# Patient Record
Sex: FEMALE | Race: BLACK OR AFRICAN AMERICAN
[De-identification: names, ages, dates, MRNs, and addresses within clinical notes are randomized per-mention and may not be internally consistent; named-entity substitution may affect disease eponyms.]

---

## 2021-06-03 ENCOUNTER — HOSPITAL ENCOUNTER (EMERGENCY)
Dept: HOSPITAL 63 - ER | Age: 41
Discharge: HOME | End: 2021-06-03
Payer: OTHER GOVERNMENT

## 2021-06-03 VITALS — BODY MASS INDEX: 32.08 KG/M2 | WEIGHT: 204.37 LBS | HEIGHT: 67 IN

## 2021-06-03 VITALS — DIASTOLIC BLOOD PRESSURE: 72 MMHG | SYSTOLIC BLOOD PRESSURE: 155 MMHG

## 2021-06-03 DIAGNOSIS — N92.0: ICD-10-CM

## 2021-06-03 DIAGNOSIS — Z91.040: ICD-10-CM

## 2021-06-03 DIAGNOSIS — F41.9: ICD-10-CM

## 2021-06-03 DIAGNOSIS — N93.8: Primary | ICD-10-CM

## 2021-06-03 LAB
APTT PPP: YELLOW S
BACTERIA #/AREA URNS HPF: 0 /HPF
BASOPHILS # BLD AUTO: 0.1 X10^3/UL (ref 0–0.2)
BASOPHILS NFR BLD: 1 % (ref 0–3)
BILIRUB UR QL STRIP: (no result)
EOSINOPHIL NFR BLD: 0.2 X10^3/UL (ref 0–0.7)
EOSINOPHIL NFR BLD: 2 % (ref 0–3)
ERYTHROCYTE [DISTWIDTH] IN BLOOD BY AUTOMATED COUNT: 13.4 % (ref 11.5–14.5)
FIBRINOGEN PPP-MCNC: CLEAR MG/DL
GLUCOSE UR STRIP-MCNC: (no result) MG/DL
HCT VFR BLD CALC: 36.5 % (ref 36–47)
HGB BLD-MCNC: 12.6 G/DL (ref 12–15.5)
LYMPHOCYTES # BLD: 1.9 X10^3/UL (ref 1–4.8)
LYMPHOCYTES NFR BLD AUTO: 23 % (ref 24–48)
MCH RBC QN AUTO: 31 PG (ref 25–35)
MCHC RBC AUTO-ENTMCNC: 35 G/DL (ref 31–37)
MCV RBC AUTO: 91 FL (ref 79–100)
MONO #: 0.6 X10^3/UL (ref 0–1.1)
MONOCYTES NFR BLD: 7 % (ref 0–9)
NEUT #: 5.8 X10^3UL (ref 1.8–7.7)
NEUTROPHILS NFR BLD AUTO: 67 % (ref 31–73)
NITRITE UR QL STRIP: (no result)
PLATELET # BLD AUTO: 364 X10^3/UL (ref 140–400)
RBC # BLD AUTO: 4.01 X10^6/UL (ref 3.5–5.4)
RBC #/AREA URNS HPF: (no result) /HPF (ref 0–2)
SP GR UR STRIP: 1.02
SQUAMOUS #/AREA URNS LPF: (no result) /LPF
UROBILINOGEN UR-MCNC: 0.2 MG/DL
WBC # BLD AUTO: 8.6 X10^3/UL (ref 4–11)
WBC #/AREA URNS HPF: (no result) /HPF (ref 0–4)

## 2021-06-03 PROCEDURE — 36415 COLL VENOUS BLD VENIPUNCTURE: CPT

## 2021-06-03 PROCEDURE — 81001 URINALYSIS AUTO W/SCOPE: CPT

## 2021-06-03 PROCEDURE — 85025 COMPLETE CBC W/AUTO DIFF WBC: CPT

## 2021-06-03 NOTE — PHYS DOC
Past History


Past Medical History:  Anxiety


Alcohol Use:  None





General Adult


EDM:


Chief Complaint:  VAGINAL BLEEDING





HPI:


HPI:





Patient is a 41-year-old female presents with increase in menstrual bleeding and

clotting.  Patient states that she was changing her tampon every 2 hours due to 

bleeding.  Patient states that bleeding has decreased today and is changing her 

tampon every 3 hours.  Patient states that she spoke with her PCP this morning 

who referred her to the emergency room.  Patient did report some dizziness and 

fatigue but denies symptoms today.  Patient denies shortness of breath, 

abdominal pain, chest pain.  Denies dysuria or abnormal discharge.  Patient has 

a history of migraines, anxiety.





Review of Systems:


Review of Systems:


Constitutional:  Denies fever or chills 


Eyes:  Denies change in visual acuity 


HENT:  Denies nasal congestion or sore throat 


Respiratory:  Denies cough or shortness of breath 


Cardiovascular:  Denies chest pain or edema 


GI:  Denies abdominal pain, nausea, vomiting, bloody stools or diarrhea 


GUVaginal: Denies dysuria.  Reports increased vaginal bleeding, cough


Musculoskeletal:  Denies back pain or joint pain 


Integument:  Denies rash 


Neurologic:  Denies headache, focal weakness or sensory changes 


Endocrine:  Denies polyuria or polydipsia 


Lymphatic:  Denies swollen glands 


Psychiatric:  Denies depression or anxiety





Allergies:


Allergies:





Allergies








Coded Allergies Type Severity Reaction Last Updated Verified


 


  latex Allergy Mild  6/3/21 Yes











Physical Exam:


PE:





Constitutional: Well developed, well nourished, no acute distress, non-toxic 

appearance. []


HENT: Normocephalic, atraumatic, bilateral external ears normal, oropharynx 

moist, no oral exudates, nose normal. []


Eyes: PERRLA, EOMI, conjunctiva normal, no discharge. [] 


Neck: Normal range of motion, no tenderness, supple, no stridor. [] 


Cardiovascular:Heart rate regular rhythm, no murmur []


Lungs & Thorax:  Bilateral breath sounds clear to auscultation []


Abdomen: Bowel sounds normal, soft, no tenderness, no masses, no pulsatile 

masses. [] 


Skin: Warm, dry, no erythema, no rash. [] 


Back: No tenderness, no CVA tenderness. [] 


Extremities: No tenderness, no cyanosis, no clubbing, ROM intact, no edema. [] 


Neurologic: Alert and oriented X 3, normal motor function, normal sensory 

function, no focal deficits noted. []


Psychologic: Affect normal, judgement normal, mood normal. []





Current Patient Data:


Vital Signs:





                                   Vital Signs








  Date Time  Temp Pulse Resp B/P (MAP) Pulse Ox O2 Delivery O2 Flow Rate FiO2


 


6/3/21 13:14 98.1 93  155/72 (99) 99 Room Air  











EKG:


EKG:


[]





Radiology/Procedures:


Radiology/Procedures:


[]





Heart Score:


C/O Chest Pain:  No


Risk Factors:


Risk Factors:  DM, Current or recent (<one month) smoker, HTN, HLP, family 

history of CAD, obesity.


Risk Scores:


Score 0 - 3:  2.5% MACE over next 6 weeks - Discharge Home


Score 4 - 6:  20.3% MACE over next 6 weeks - Admit for Clinical Observation


Score 7 - 10:  72.7% MACE over next 6 weeks - Early Invasive Strategies





Course & Med Decision Making:


Course & Med Decision Making


Pertinent Labs and Imaging studies reviewed. (See chart for details)





[] 41-year-old female presents with an increase in menstrual bleeding and 

clotting.  Patient reports her period started on 5/30 and on the first and 

second she started having an increase in bleeding.  Patient also reported fat

igue and dizziness.  All symptoms have resolved today.  Bleeding has decreased. 


CBC ordered to rule out acute abnormalities.  Hemoglobin is 12.6.  All other 

labs unremarkable.  UA is negative for infection.  Patient denies a history of 

fibroids, hypothyroidism, vaginal trauma.  


Patient is experiencing a heavy menses with her regular cycle.  Patient given 

strict return precautions if she has an increase in bleeding, dizziness, 

shortness of breath or pain.  Advised patient to keep her appointment with her P

CP for follow-up and further management.  Patient is appreciative and okay with 

discharge plan.





Dragon Disclaimer:


Dragon Disclaimer:


This electronic medical record was generated, in whole or in part, using a voice

 recognition dictation system.





Departure


Departure:


Impression:  


   Primary Impression:  


   Vaginal bleeding


   Additional Impression:  


   Heavy menstrual bleeding


   Qualified Codes:  N92.0 - Excessive and frequent menstruation with regular 

   cycle


Disposition:  01 HOME / SELF CARE / HOMELESS


Condition:  STABLE


Referrals:  


PCP,UNKNOWN (PCP)


Patient Instructions:  Menorrhagia





Additional Instructions:  


You were seen in the emergency room for heavy menstrual bleeding with your 

regular cycle.  All of your labs are unremarkable.  Your urine was negative for 

infection.  Please follow-up with your PCP for further management.  If you have 

an increase in bleeding, abdominal pain, dizziness, or any other concerns please

 return to the emergency room.





EMERGENCY DEPARTMENT GENERAL DISCHARGE INSTRUCTIONS





Thank you for coming to [facility] (ED) today and trusting us

 with you 


care.  We trust that you had a positivie experience in our Emergency Department.

  If you 


wish to speak to the department management, you may call the director at 

[phone].





YOUR FOLLOW UP INSTRUCTIONS ARE AS FOLLOWS:





1.  Do you have a private Doctor?  If you do not have a private doctor, please 

ask for a 


resource list of physicians or clinics that may be able to assist you with 

follow up care.





2.  The Emergency Physician has interpreted your x-rays.  The X-Ray specialist 

will also 


review them.  If there is a change in the findings, you will be notified in 48 

hours when at 


all possible.





3.  A lab test or culture has been done, your results will be reviewed and you 

will be 


notified if you need a change in treatment.





ADDITIONAL INSTRUCTIONS AND INFORMATION:





1.  Your care today has been supervised by a physician who is specially trained 

in emergency 


care.  Many problems require more than one evaluation for a complete diagnosis 

and 


treatment.  We recommend that you schedule your follow up appointment as 

recommended to 


ensure complete treatment of you illness or injury.  If you are unable to obtain

 follow up 


care and continue to have a problem, or if your condition worsens, we recommend 

that you 


return to the ED.





2.  We are not able to safely determine your condition over the phone nor are we

 able to 


give sound medical advice over the phone.  For these safety reasons, if you call

 for medical 


advice we will ask you to come to the ED for further evaluation.





3.  If you have any questions regarding these discharge instructions please call

 the ED at 


[phone].





SAFETY INFORMATION:





In the interest of safety, wellness, and injury prevention; we encourage you to 

wear your 


sealbelt, if you smoke; quite smoking, and we encourage family to use a 

protective helmet 


for bicycling and other sporting events that present an increased risk for head 

injury.





IF YOUR SYMPTOMS WORSEN OR NEW SYMPTOMS DEVELOP, OR YOU HAVE CONCERNS ABOUT YOUR

 CONDITION; 


OR IF YOUR CONDITION WORSENS WHILE YOU ARE WAITING FOR YOUR FOLLOW UP 

APPOINTMENT; EITHER 


CONTACT YOUR PRIMARY CARE DOCTOR, THE PHYSICIAN WHOSE NAME AND NUMBER YOU WERE 

GIVEN, OR 


RETURN TO THE ED IMMEDIATELY.











AUDIE HEBERT                Mayur 3, 2021 14:08
113

## 2021-06-25 ENCOUNTER — HOSPITAL ENCOUNTER (EMERGENCY)
Dept: HOSPITAL 63 - ER | Age: 41
Discharge: HOME | End: 2021-06-25
Payer: OTHER GOVERNMENT

## 2021-06-25 VITALS — BODY MASS INDEX: 32.7 KG/M2 | WEIGHT: 208.34 LBS | HEIGHT: 67 IN

## 2021-06-25 VITALS — SYSTOLIC BLOOD PRESSURE: 138 MMHG | DIASTOLIC BLOOD PRESSURE: 90 MMHG

## 2021-06-25 DIAGNOSIS — R00.2: ICD-10-CM

## 2021-06-25 DIAGNOSIS — R07.89: Primary | ICD-10-CM

## 2021-06-25 DIAGNOSIS — I10: ICD-10-CM

## 2021-06-25 DIAGNOSIS — Z91.040: ICD-10-CM

## 2021-06-25 LAB
ALBUMIN SERPL-MCNC: 3.8 G/DL (ref 3.4–5)
ALBUMIN/GLOB SERPL: 1 {RATIO} (ref 1–1.7)
ALP SERPL-CCNC: 51 U/L (ref 46–116)
ALT SERPL-CCNC: 25 U/L (ref 14–59)
ANION GAP SERPL CALC-SCNC: 13 MMOL/L (ref 6–14)
AST SERPL-CCNC: 18 U/L (ref 15–37)
BASOPHILS # BLD AUTO: 0.1 X10^3/UL (ref 0–0.2)
BASOPHILS NFR BLD: 1 % (ref 0–3)
BILIRUB SERPL-MCNC: 0.3 MG/DL (ref 0.2–1)
BUN/CREAT SERPL: 11 (ref 6–20)
CA-I SERPL ISE-MCNC: 10 MG/DL (ref 7–20)
CALCIUM SERPL-MCNC: 8.9 MG/DL (ref 8.5–10.1)
CHLORIDE SERPL-SCNC: 105 MMOL/L (ref 98–107)
CO2 SERPL-SCNC: 23 MMOL/L (ref 21–32)
CREAT SERPL-MCNC: 0.9 MG/DL (ref 0.6–1)
EOSINOPHIL NFR BLD: 0.1 X10^3/UL (ref 0–0.7)
EOSINOPHIL NFR BLD: 2 % (ref 0–3)
ERYTHROCYTE [DISTWIDTH] IN BLOOD BY AUTOMATED COUNT: 13.4 % (ref 11.5–14.5)
GFR SERPLBLD BASED ON 1.73 SQ M-ARVRAT: 83.9 ML/MIN
GLOBULIN SER-MCNC: 4 G/DL (ref 2.2–3.8)
GLUCOSE SERPL-MCNC: 98 MG/DL (ref 70–99)
HCT VFR BLD CALC: 37.5 % (ref 36–47)
HGB BLD-MCNC: 12.7 G/DL (ref 12–15.5)
LYMPHOCYTES # BLD: 2.2 X10^3/UL (ref 1–4.8)
LYMPHOCYTES NFR BLD AUTO: 27 % (ref 24–48)
MAGNESIUM SERPL-MCNC: 2 MG/DL (ref 1.8–2.4)
MCH RBC QN AUTO: 31 PG (ref 25–35)
MCHC RBC AUTO-ENTMCNC: 34 G/DL (ref 31–37)
MCV RBC AUTO: 91 FL (ref 79–100)
MONO #: 1 X10^3/UL (ref 0–1.1)
MONOCYTES NFR BLD: 12 % (ref 0–9)
NEUT #: 4.8 X10^3UL (ref 1.8–7.7)
NEUTROPHILS NFR BLD AUTO: 59 % (ref 31–73)
PLATELET # BLD AUTO: 343 X10^3/UL (ref 140–400)
POTASSIUM SERPL-SCNC: 3.5 MMOL/L (ref 3.5–5.1)
PROT SERPL-MCNC: 7.8 G/DL (ref 6.4–8.2)
RBC # BLD AUTO: 4.13 X10^6/UL (ref 3.5–5.4)
SODIUM SERPL-SCNC: 141 MMOL/L (ref 136–145)
WBC # BLD AUTO: 8.1 X10^3/UL (ref 4–11)

## 2021-06-25 PROCEDURE — 84484 ASSAY OF TROPONIN QUANT: CPT

## 2021-06-25 PROCEDURE — 85025 COMPLETE CBC W/AUTO DIFF WBC: CPT

## 2021-06-25 PROCEDURE — 71046 X-RAY EXAM CHEST 2 VIEWS: CPT

## 2021-06-25 PROCEDURE — 36415 COLL VENOUS BLD VENIPUNCTURE: CPT

## 2021-06-25 PROCEDURE — 93005 ELECTROCARDIOGRAM TRACING: CPT

## 2021-06-25 PROCEDURE — 83735 ASSAY OF MAGNESIUM: CPT

## 2021-06-25 PROCEDURE — 80053 COMPREHEN METABOLIC PANEL: CPT

## 2021-06-25 NOTE — RAD
PA and lateral chest.



HISTORY: Chest pain



PA and lateral views were taken of the chest. Lungs are free of infiltrates. Heart is normal in size.
 There is no pleural effusion.



IMPRESSION:

1. No acute chest disease.



Electronically signed by: Jem Cade MD (6/25/2021 8:31 PM) [facility]

## 2021-06-25 NOTE — PHYS DOC
Past History


Past Medical History:  Anxiety, Hypertension


Past Surgical History:  No Surgical History


Alcohol Use:  None





Adult General


Chief Complaint


Chief Complaint:  Palpitations





HPI


HPI





Patient is a 41-year-old female presents emergency department chief complaint of

chest palpitations and a lump feeling in her throat ever since her 14-year-old 

son went to visit family in [place] and left on a plane traveling by himself.  

Patient states she is worried about the COVID-19 practices in the setting 

states.  Patient states that when she told her mother and father about her 

symptoms, they became worried and told her she should come to the emergency 

department to make sure she is not having a heart attack.  Patient denies any 

family history of heart disease, stating both her parents are alive and well.  

Patient denies any cough or shortness of breath.  Patient denies any other 

physical complaints or physical concerns.  Patient reports an allergy to FU 

latex, states she only takes magnesium for headaches and multivitamins.  Patient

states she does have a history of anxiety in which she was treated with 

medications but has since self DC'd her medications and tries to control her 

anxiety through prevention of triggers.  Patient denies any other physical 

complaints or physical concerns.





Review of Systems


Review of Systems





14 body systems of review of systems have been reviewed.  See HPI for pertinent 

positives and negative responses, otherwise all other systems are negative, 

nonpertinent or noncontributory.





Current Medications


Current Medications





Current Medications








 Medications


  (Trade)  Dose


 Ordered  Sig/Yuly  Start Time


 Stop Time Status Last Admin


Dose Admin


 


 Aspirin


  (Aspirin


 Chewable)  324 mg  1X  ONCE  21 20:30


 21 20:32 DC 21 20:37


324 MG











Allergies


Allergies





Allergies








Coded Allergies Type Severity Reaction Last Updated Verified


 


  latex Allergy Mild  6/3/21 Yes











Physical Exam


Physical Exam





Constitutional: Well developed, well nourished, no acute distress, non-toxic 

appearance.  41-year-old female no apparent distress.


HENT: Normocephalic, atraumatic.


Eyes: Conjunctiva normal, no discharge.


Neck: Normal range of motion.


Cardiovascular:Heart rate regular rhythm, no murmur, heart sounds S1-S2 to 

auscultation.


Lungs & Thorax:  Bilateral breath sounds clear to auscultation no adventitious 

lung sounds appreciated.


Abdomen: Bowel sounds normal, soft, no tenderness, no masses, no pulsatile 

masses.  


Skin: Warm, dry, no erythema, no rash.  


Back: No tenderness, no CVA tenderness.  


Extremities: No tenderness, no cyanosis, no clubbing, ROM intact, no edema.  


Neurologic: Alert and oriented X 3, normal motor function, normal sensory 

function, no focal deficits noted. 


Psychologic: Affect normal, judgement normal, mood normal.





Current Patient Data


Vital Signs





                                   Vital Signs








  Date Time  Temp Pulse Resp B/P (MAP) Pulse Ox O2 Delivery O2 Flow Rate FiO2


 


21 20:09  75 20 159/105 (123) 99 Room Air  


 


21 19:45 98.0       








Lab Results





                                Laboratory Tests








Test


 21


20:15


 


White Blood Count


 8.1 x10^3/uL


(4.0-11.0)


 


Red Blood Count


 4.13 x10^6/uL


(3.50-5.40)


 


Hemoglobin


 12.7 g/dL


(12.0-15.5)


 


Hematocrit


 37.5 %


(36.0-47.0)


 


Mean Corpuscular Volume


 91 fL ()





 


Mean Corpuscular Hemoglobin 31 pg (25-35)  


 


Mean Corpuscular Hemoglobin


Concent 34 g/dL


(31-37)


 


Red Cell Distribution Width


 13.4 %


(11.5-14.5)


 


Platelet Count


 343 x10^3/uL


(140-400)


 


Neutrophils (%) (Auto) 59 % (31-73)  


 


Lymphocytes (%) (Auto) 27 % (24-48)  


 


Monocytes (%) (Auto) 12 % (0-9)  H


 


Eosinophils (%) (Auto) 2 % (0-3)  


 


Basophils (%) (Auto) 1 % (0-3)  


 


Neutrophils # (Auto)


 4.8 x10^3uL


(1.8-7.7)


 


Lymphocytes # (Auto)


 2.2 x10^3/uL


(1.0-4.8)


 


Monocytes # (Auto)


 1.0 x10^3/uL


(0.0-1.1)


 


Eosinophils # (Auto)


 0.1 x10^3/uL


(0.0-0.7)


 


Basophils # (Auto)


 0.1 x10^3/uL


(0.0-0.2)


 


Sodium Level


 141 mmol/L


(136-145)


 


Potassium Level


 3.5 mmol/L


(3.5-5.1)


 


Chloride Level


 105 mmol/L


()


 


Carbon Dioxide Level


 23 mmol/L


(21-32)


 


Anion Gap 13 (6-14)  


 


Blood Urea Nitrogen


 10 mg/dL


(7-20)


 


Creatinine


 0.9 mg/dL


(0.6-1.0)


 


Estimated GFR


(Cockcroft-Gault) 83.9  





 


BUN/Creatinine Ratio 11 (6-20)  


 


Glucose Level


 98 mg/dL


(70-99)


 


Calcium Level


 8.9 mg/dL


(8.5-10.1)


 


Magnesium Level


 2.0 mg/dL


(1.8-2.4)


 


Total Bilirubin


 0.3 mg/dL


(0.2-1.0)


 


Aspartate Amino Transferase


(AST) 18 U/L (15-37)





 


Alanine Aminotransferase (ALT)


 25 U/L (14-59)





 


Alkaline Phosphatase


 51 U/L


()


 


Troponin I Quantitative


 < 0.017 ng/mL


(0-0.055)


 


Total Protein


 7.8 g/dL


(6.4-8.2)


 


Albumin


 3.8 g/dL


(3.4-5.0)


 


Albumin/Globulin Ratio 1.0 (1.0-1.7)  











EKG


EKG


EKG performed at  by ED nursing staff, shows a normal sinus rhythm without 

ectopy heart rate 78 bpm, VT interval 0.192, QTc interval 0.455, no acute STEMI,

no ACS, no acute ischemia appreciated, EKG interpreted by ED attending physician

Dr. Perkins.





Radiology/Procedures


Radiology/Procedures





PATIENT: ERIN HARGROVE CACCOUNT: [id number]     MRN#: [id number]


: 1980           LOCATION: ER              AGE: 41


SEX: F                    EXAM DT: 21         ACCESSION#: [id number]


STATUS: PRE ER            ORD. PHYSICIAN: STEVE MARROQUIN


REASON: CHEST PAIN, heart palpitations


PROCEDURE: CHEST PA & LATERAL





PA and lateral chest.





HISTORY: Chest pain





PA and lateral views were taken of the chest. Lungs are free of infiltrates. 

Heart is normal in size. There is no pleural effusion.





IMPRESSION:


1. No acute chest disease.





Electronically signed by: Jem Cade MD (2021 8:31 PM) [facility]














DICTATED AND SIGNED BY:     JEM CADE MD


DATE:     21





CC: STEVE MARROQUIN; PCP,UNKNOWN ~MTH0 0





Heart Score


C/O Chest Pain:  Yes


HEART Score for Chest Pain:  








HEART Score for Chest Pain Response (Comments) Value


 


History Slighlty/Non-Suspicious 0


 


ECG Normal 0


 


Age < 45 0


 


Risk Factors No Risk Factors 0


 


Troponin < Normal Limit 0


 


Total  0








Risk Factors:


Risk Factors:  DM, Current or recent (<one month) smoker, HTN, HLP, family 

history of CAD, obesity.


Risk Scores:


Risk Factors:  DM, Current or recent (<one month) smoker, HTN, HLP, family 

history of CAD, obesity.





Course & Med Decision Making


Course & Med Decision Making


Pertinent Labs and Imaging studies reviewed. (See chart for details)





41-year-old female, vital signs reviewed, presents emergency department 

concerning chest palpitations after her 15-year-old son travel to [place] 

unaccompanied on an aircraft.  Patient's physical examination was unremarkable. 

Will order cardiorespiratory work-up.





Cardiorespiratory work-up nonconcerning, chest EKG and lab work all within 

normal limits.  Discussed with patient this is most likely anxiety related to 

her history of anxiety and her son traveling to [place] unaccompanied to visit 

family.  Patient agreed with this stating that she just wanted to make sure that

her heart was okay as her family made her feel concerned related to her 

symptoms.  Patient states she is happy her heart and lungs are okay.





Patient gave verbal understanding discharge home instructions, follow-up with 

PCP this week, return to ER precautions or concerns, patient had no further 

questions or concerns and was discharged home without incident.





Dragon Disclaimer


Dragon Disclaimer


This electronic medical record was generated, in whole or in part, using a voice

recognition dictation system.





Departure


Departure:


Impression:  


   Primary Impression:  


   Palpitations with regular cardiac rhythm


Disposition:   HOME / SELF CARE / HOMELESS


Condition:  GOOD


Referrals:  


PCP,UNKNOWN (PCP)


Patient Instructions:  Palpitations





Additional Instructions:  


You were seen today in the emergency department for heart palpitations.  An 

extensive cardiorespiratory work-up was performed in the ED today.  There were 

no concerning findings on your chest x-ray or your lab work that would warrant 

admission to the hospital or immediate consultation by a cardiology or 

pulmonology specialist.  You and I discussed at length possible reasons for your

palpitations, as you indicated, there may be some anxiety component to your 

teenage son traveling by himself down to [place] for the next month.  Please do 

not hesitate to follow-up with your primary care provider for any ongoing or 

persistent anxiety problems for evaluation and treatment.  Please return to the 

emergency department for worsening symptoms or other concerns.





EMERGENCY DEPARTMENT GENERAL DISCHARGE INSTRUCTIONS





Thank you for coming to [facility] (ED) today and trusting us

with you 


care.  We trust that you had a positivie experience in our Emergency Department.

 If you 


wish to speak to the department management, you may call the director at 

[phone].





YOUR FOLLOW UP INSTRUCTIONS ARE AS FOLLOWS:





1.  Do you have a private Doctor?  If you do not have a private doctor, please 

ask for a 


resource list of physicians or clinics that may be able to assist you with 

follow up care.





2.  The Emergency Physician has interpreted your x-rays.  The X-Ray specialist 

will also 


review them.  If there is a change in the findings, you will be notified in 48 

hours when at 


all possible.





3.  A lab test or culture has been done, your results will be reviewed and you 

will be 


notified if you need a change in treatment.





ADDITIONAL INSTRUCTIONS AND INFORMATION:





1.  Your care today has been supervised by a physician who is specially trained 

in emergency 


care.  Many problems require more than one evaluation for a complete diagnosis 

and 


treatment.  We recommend that you schedule your follow up appointment as 

recommended to 


ensure complete treatment of you illness or injury.  If you are unable to obtain

follow up 


care and continue to have a problem, or if your condition worsens, we recommend 

that you 


return to the ED.





2.  We are not able to safely determine your condition over the phone nor are we

able to 


give sound medical advice over the phone.  For these safety reasons, if you call

for medical 


advice we will ask you to come to the ED for further evaluation.





3.  If you have any questions regarding these discharge instructions please call

the ED at 


[phone].





SAFETY INFORMATION:





In the interest of safety, wellness, and injury prevention; we encourage you to 

wear your 


sealbelt, if you smoke; quite smoking, and we encourage family to use a 

protective helmet 


for bicycling and other sporting events that present an increased risk for head 

injury.





IF YOUR SYMPTOMS WORSEN OR NEW SYMPTOMS DEVELOP, OR YOU HAVE CONCERNS ABOUT YOUR

CONDITION; 


OR IF YOUR CONDITION WORSENS WHILE YOU ARE WAITING FOR YOUR FOLLOW UP 

APPOINTMENT; EITHER 


CONTACT YOUR PRIMARY CARE DOCTOR, THE PHYSICIAN WHOSE NAME AND NUMBER YOU WERE 

GIVEN, OR 


RETURN TO THE ED IMMEDIATELY.











STEVE MARROQUIN APRN       2021 21:15

## 2021-06-25 NOTE — EKG
[facility] [address], [place] [address]

Test Date:    2021               Test Time:    19:51:34

Pat Name:     ERIN HARGORVE       Department:   

Patient ID:   [id number]           Room:          

Gender:       F                        Technician:   

:          1980               Requested By: STEVE MARROQUIN

Order Number: [id number]            Reading MD:     

                                 Measurements

Intervals                              Axis          

Rate:         78                       P:            27

MT:           192                      QRS:          9

QRSD:         80                       T:            3

QT:           396                                    

QTc:          455                                    

                           Interpretive Statements

SINUS RHYTHM

NORMAL ECG

RI6.02

No previous ECG available for comparison

## 2021-10-19 ENCOUNTER — HOSPITAL ENCOUNTER (EMERGENCY)
Dept: HOSPITAL 63 - ER | Age: 41
Discharge: HOME | End: 2021-10-19
Payer: OTHER GOVERNMENT

## 2021-10-19 VITALS
SYSTOLIC BLOOD PRESSURE: 131 MMHG | SYSTOLIC BLOOD PRESSURE: 131 MMHG | DIASTOLIC BLOOD PRESSURE: 78 MMHG | DIASTOLIC BLOOD PRESSURE: 78 MMHG

## 2021-10-19 VITALS — BODY MASS INDEX: 32.6 KG/M2 | HEIGHT: 67 IN | WEIGHT: 207.68 LBS

## 2021-10-19 DIAGNOSIS — I10: ICD-10-CM

## 2021-10-19 DIAGNOSIS — Z91.040: ICD-10-CM

## 2021-10-19 DIAGNOSIS — R10.11: Primary | ICD-10-CM

## 2021-10-19 LAB
ALBUMIN SERPL-MCNC: 3.8 G/DL (ref 3.4–5)
ALBUMIN/GLOB SERPL: 0.9 {RATIO} (ref 1–1.7)
ALP SERPL-CCNC: 49 U/L (ref 46–116)
ALT SERPL-CCNC: 26 U/L (ref 14–59)
ANION GAP SERPL CALC-SCNC: 9 MMOL/L (ref 6–14)
APTT PPP: YELLOW S
AST SERPL-CCNC: 16 U/L (ref 15–37)
BACTERIA #/AREA URNS HPF: (no result) /HPF
BASOPHILS # BLD AUTO: 0.1 X10^3/UL (ref 0–0.2)
BASOPHILS NFR BLD: 1 % (ref 0–3)
BILIRUB SERPL-MCNC: 0.4 MG/DL (ref 0.2–1)
BILIRUB UR QL STRIP: (no result)
BUN/CREAT SERPL: 14 (ref 6–20)
CA-I SERPL ISE-MCNC: 10 MG/DL (ref 7–20)
CALCIUM SERPL-MCNC: 9.3 MG/DL (ref 8.5–10.1)
CHLORIDE SERPL-SCNC: 104 MMOL/L (ref 98–107)
CO2 SERPL-SCNC: 27 MMOL/L (ref 21–32)
CREAT SERPL-MCNC: 0.7 MG/DL (ref 0.6–1)
EOSINOPHIL NFR BLD: 0.2 X10^3/UL (ref 0–0.7)
EOSINOPHIL NFR BLD: 2 % (ref 0–3)
ERYTHROCYTE [DISTWIDTH] IN BLOOD BY AUTOMATED COUNT: 14 % (ref 11.5–14.5)
FIBRINOGEN PPP-MCNC: CLEAR MG/DL
GFR SERPLBLD BASED ON 1.73 SQ M-ARVRAT: 112.1 ML/MIN
GLOBULIN SER-MCNC: 4.4 G/DL (ref 2.2–3.8)
GLUCOSE SERPL-MCNC: 84 MG/DL (ref 70–99)
GLUCOSE UR STRIP-MCNC: (no result) MG/DL
HCT VFR BLD CALC: 38.9 % (ref 36–47)
HGB BLD-MCNC: 12.9 G/DL (ref 12–15.5)
LIPASE: 103 U/L (ref 73–393)
LYMPHOCYTES # BLD: 2 X10^3/UL (ref 1–4.8)
LYMPHOCYTES NFR BLD AUTO: 28 % (ref 24–48)
MCH RBC QN AUTO: 30 PG (ref 25–35)
MCHC RBC AUTO-ENTMCNC: 33 G/DL (ref 31–37)
MCV RBC AUTO: 91 FL (ref 79–100)
MONO #: 0.7 X10^3/UL (ref 0–1.1)
MONOCYTES NFR BLD: 11 % (ref 0–9)
NEUT #: 4.1 X10^3UL (ref 1.8–7.7)
NEUTROPHILS NFR BLD AUTO: 59 % (ref 31–73)
NITRITE UR QL STRIP: (no result)
PLATELET # BLD AUTO: 392 X10^3/UL (ref 140–400)
POTASSIUM SERPL-SCNC: 3.5 MMOL/L (ref 3.5–5.1)
PROT SERPL-MCNC: 8.2 G/DL (ref 6.4–8.2)
RBC # BLD AUTO: 4.28 X10^6/UL (ref 3.5–5.4)
RBC #/AREA URNS HPF: (no result) /HPF (ref 0–2)
SODIUM SERPL-SCNC: 140 MMOL/L (ref 136–145)
SP GR UR STRIP: 1.02
SQUAMOUS #/AREA URNS LPF: (no result) /LPF
UROBILINOGEN UR-MCNC: 0.2 MG/DL
WBC # BLD AUTO: 7.1 X10^3/UL (ref 4–11)
WBC #/AREA URNS HPF: (no result) /HPF (ref 0–4)

## 2021-10-19 PROCEDURE — 71045 X-RAY EXAM CHEST 1 VIEW: CPT

## 2021-10-19 PROCEDURE — 83690 ASSAY OF LIPASE: CPT

## 2021-10-19 PROCEDURE — 96375 TX/PRO/DX INJ NEW DRUG ADDON: CPT

## 2021-10-19 PROCEDURE — 81001 URINALYSIS AUTO W/SCOPE: CPT

## 2021-10-19 PROCEDURE — 99285 EMERGENCY DEPT VISIT HI MDM: CPT

## 2021-10-19 PROCEDURE — 81025 URINE PREGNANCY TEST: CPT

## 2021-10-19 PROCEDURE — 85025 COMPLETE CBC W/AUTO DIFF WBC: CPT

## 2021-10-19 PROCEDURE — 96374 THER/PROPH/DIAG INJ IV PUSH: CPT

## 2021-10-19 PROCEDURE — 96361 HYDRATE IV INFUSION ADD-ON: CPT

## 2021-10-19 PROCEDURE — 36415 COLL VENOUS BLD VENIPUNCTURE: CPT

## 2021-10-19 PROCEDURE — 76705 ECHO EXAM OF ABDOMEN: CPT

## 2021-10-19 PROCEDURE — 80053 COMPREHEN METABOLIC PANEL: CPT

## 2021-10-19 NOTE — RAD
XR CHEST 1V



History: Reason: ruq pain, rule out pna / Spl. Instructions:  / History: 



Comparison: June 25, 2021



Findings:

No consolidation or pleural effusion. Normal heart size. No pneumothorax.



Impression: 

1.  No acute cardiopulmonary process.



Electronically signed by: Brad Carlos DO (10/19/2021 3:03 PM) [id number]

## 2021-10-19 NOTE — RAD
INDICATION : Reason: RUQ PAIN RADIATING TO BACK AND SHOULDER / Spl. Instructions:  / History: 



COMPARISON: None



TECHNIQUE: Multiple ultrasound images obtained through the abdomen in grayscale and color.



FINDINGS:



Pancreas: Portions not well seen secondary to overlying structures obscuring. Heterogeneity.

Liver: Echogenic. Relatively hypoechoic region adjacent to the gallbladder which is commonly secondar
y to focal fatty sparing

Gallbladder: No wall thickening or stones.

IVC: Partially distended at level of liver.

Common Bile Duct: Not dilated.

Right Kidney:  No hydronephrosis.



IMPRESSION:



*   Echogenic liver which can be seen with fatty infiltration.



*  Heterogenous hypoechoic appearance of the pancreas. Would correlate with symptoms and lab markers 
since pancreatitis could have this appearance.



Electronically signed by: Terell Harrington MD (10/19/2021 1:42 PM) [id number]

## 2021-10-19 NOTE — PHYS DOC
Past History


Past Medical History:  Anxiety, Hypertension


Past Surgical History:  No Surgical History


Alcohol Use:  None





General Adult


EDM:


Chief Complaint:  ABDOMINAL PAIN





HPI:


HPI:


41-year-old female presents to the emergency department complaining of right 

upper quadrant abdominal pain with radiation towards the back and flank for the 

past 1 week that is intermittent, sharp, coming in short bursts is not related 

any particular activity.  She denies increased pain upon eating.  She further 

denies any urinary changes, fever cough or shortness of breath.  She has never 

had this pain before.  She has not been treating her symptoms at home with 

anything.  She has abdomen pain started it was in her back and radiates to the 

front but now is more on the front radiating towards the back.  The patient 

denies nausea, vomiting, fever, chills, chest pain, shortness of breath, cough, 

recent trauma, or any other complaints.





Review of Systems:


Review of Systems:


Review of systems otherwise negative except for what was mentioned in the HPI





Current Medications:


Current Meds:





Current Medications








 Medications


  (Trade)  Dose


 Ordered  Sig/Yuly  Start Time


 Stop Time Status Last Admin


Dose Admin


 


 Acetaminophen


  (Tylenol)  1,000 mg  1X  ONCE  10/19/21 13:15


 10/19/21 13:16 DC  





 


 Ondansetron HCl


  (Zofran)  4 mg  1X  ONCE  10/19/21 13:15


 10/19/21 13:16 DC  





 


 Sodium Chloride  1,000 ml @ 


 100 mls/hr  Q10H  10/19/21 13:15


 10/19/21 23:14   














Allergies:


Allergies:





Allergies








Coded Allergies Type Severity Reaction Last Updated Verified


 


  latex Allergy Mild  6/3/21 Yes











Physical Exam:


PE:


Constitutional: No acute distress, non-toxic appearance.


HENT: Atraumatic, bilateral external ears normal, nose normal.


Eyes: PERRLA, EOMI, conjunctiva normal, no discharge.


Neck: Normal range of motion, supple, no stridor.


Cardiovascular: Heart rate regular rhythm. 2+ radial pulses


Lungs & Thorax: No respiratory distress, symmetrical expansion.


Abdomen: Soft, right upper quadrant tenderness to palpation


Skin: Warm, dry.


Extremities: No tenderness, no cyanosis, ROM intact, no edema.


Neurologic: Alert and oriented X 3, normal motor function, normal sensory 

function, no focal deficits noted. Non ataxic gait. GCS 15.


Psychologic: Affect normal, judgment normal, mood normal.





Current Patient Data:


Labs:








                                Laboratory Tests








Test


 10/19/21


13:48 10/19/21


13:58


 


White Blood Count


 7.1 x10^3/uL


(4.0-11.0) 





 


Red Blood Count


 4.28 x10^6/uL


(3.50-5.40) 





 


Hemoglobin


 12.9 g/dL


(12.0-15.5) 





 


Hematocrit


 38.9 %


(36.0-47.0) 





 


Mean Corpuscular Volume


 91 fL ()


 





 


Mean Corpuscular Hemoglobin 30 pg (25-35)   


 


Mean Corpuscular Hemoglobin


Concent 33 g/dL


(31-37) 





 


Red Cell Distribution Width


 14.0 %


(11.5-14.5) 





 


Platelet Count


 392 x10^3/uL


(140-400) 





 


Neutrophils (%) (Auto) 59 % (31-73)   


 


Lymphocytes (%) (Auto) 28 % (24-48)   


 


Monocytes (%) (Auto) 11 % (0-9)  H 


 


Eosinophils (%) (Auto) 2 % (0-3)   


 


Basophils (%) (Auto) 1 % (0-3)   


 


Neutrophils # (Auto)


 4.1 x10^3uL


(1.8-7.7) 





 


Lymphocytes # (Auto)


 2.0 x10^3/uL


(1.0-4.8) 





 


Monocytes # (Auto)


 0.7 x10^3/uL


(0.0-1.1) 





 


Eosinophils # (Auto)


 0.2 x10^3/uL


(0.0-0.7) 





 


Basophils # (Auto)


 0.1 x10^3/uL


(0.0-0.2) 





 


Urine Collection Type Unknown   


 


Urine Color Yellow   


 


Urine Clarity Clear   


 


Urine pH 5.5   


 


Urine Specific Gravity 1.025   


 


Urine Protein


 Neg


(NEG-TRACE) 





 


Urine Glucose (UA)


 Neg mg/dL


(NEG) 





 


Urine Ketones (Stick)


 Neg mg/dL


(NEG) 





 


Urine Blood Small (NEG)   


 


Urine Nitrite Neg (NEG)   


 


Urine Bilirubin Neg (NEG)   


 


Urine Urobilinogen Dipstick


 0.2 mg/dL (0.2


mg/dL) 





 


Urine Leukocyte Esterase Neg (NEG)   


 


Urine RBC


 1-2 /HPF (0-2)


 





 


Urine WBC


 1-4 /HPF (0-4)


 





 


Urine Squamous Epithelial


Cells Few /LPF  


 





 


Urine Bacteria


 Few /HPF


(0-FEW) 





 


Urine Mucus Slight /LPF   


 


Sodium Level


 140 mmol/L


(136-145) 





 


Potassium Level


 3.5 mmol/L


(3.5-5.1) 





 


Chloride Level


 104 mmol/L


() 





 


Carbon Dioxide Level


 27 mmol/L


(21-32) 





 


Anion Gap 9 (6-14)   


 


Blood Urea Nitrogen


 10 mg/dL


(7-20) 





 


Creatinine


 0.7 mg/dL


(0.6-1.0) 





 


Estimated GFR


(Cockcroft-Gault) 112.1  


 





 


BUN/Creatinine Ratio 14 (6-20)   


 


Glucose Level


 84 mg/dL


(70-99) 





 


Calcium Level


 9.3 mg/dL


(8.5-10.1) 





 


Total Bilirubin


 0.4 mg/dL


(0.2-1.0) 





 


Aspartate Amino Transferase


(AST) 16 U/L (15-37)


 





 


Alanine Aminotransferase (ALT)


 26 U/L (14-59)


 





 


Alkaline Phosphatase


 49 U/L


() 





 


Total Protein


 8.2 g/dL


(6.4-8.2) 





 


Albumin


 3.8 g/dL


(3.4-5.0) 





 


Albumin/Globulin Ratio


 0.9 (1.0-1.7)


L 





 


Lipase


 103 U/L


() 





 


POC Urine HCG, Qualitative


 


 hcg negative


(Negative)








Vital Signs:





                                   Vital Signs








  Date Time  Temp Pulse Resp B/P (MAP) Pulse Ox O2 Delivery O2 Flow Rate FiO2


 


10/19/21 13:03 98.1 83 18 149/109 (122) 100 Room Air  











Radiology/Procedures:


Radiology/Procedures:


PROCEDURE: ABDOMEN OR LWR BACK LTD








INDICATION : Reason: RUQ PAIN RADIATING TO BACK AND SHOULDER / Spl. 

Instructions:  / History: 





COMPARISON: None





TECHNIQUE: Multiple ultrasound images obtained through the abdomen in grayscale 

and color.





FINDINGS:





Pancreas: Portions not well seen secondary to overlying structures obscuring. 

Heterogeneity.


Liver: Echogenic. Relatively hypoechoic region adjacent to the gallbladder which

is commonly secondary to focal fatty sparing


Gallbladder: No wall thickening or stones.


IVC: Partially distended at level of liver.


Common Bile Duct: Not dilated.


Right Kidney:  No hydronephrosis.





IMPRESSION:





*   Echogenic liver which can be seen with fatty infiltration.





*  Heterogenous hypoechoic appearance of the pancreas. Would correlate with 

symptoms and lab markers since pancreatitis could have this appearance.





Electronically signed by: Terell Harrington MD (10/19/2021 1:42 PM) 





PROCEDURE: CHEST AP ONLY





XR CHEST 1V





History: Reason: ruq pain, rule out pna / Spl. Instructions:  / History: 





Comparison: June 25, 2021





Findings:


No consolidation or pleural effusion. Normal heart size. No pneumothorax.





Impression: 


1.  No acute cardiopulmonary process.





Electronically signed by: Brad Carlos DO (10/19/2021 3:03 PM)





Heart Score:


C/O Chest Pain:  No





Course & Med Decision Making:


Course & Med Decision Making


Work-up is largely negative, plan to discharge the patient home with return 

precautions, urine is likely contaminated as patient does not have urinary 

symptoms. Will treat for msk pain.





Patient passes all PERC criteria





Departure


Departure:


Impression:  


   Primary Impression:  


   RUQ pain


Disposition:  01 HOME / SELF CARE / HOMELESS


Condition:  STABLE


Referrals:  


PCP,UNKNOWN (PCP)


Patient Instructions:  Musculoskeletal Pain





Additional Instructions:  


You were seen in the emergency department and your health condition was deemed 

not to require admission to the hospital.  It is important to realize that we 

can only evaluate you during the time that you are in her department.  

Occasionally health conditions can worsen upon leaving the emergency department.

 If this were to happen, please return to and allow us the opportunity to 

reevaluate you.  It is a pleasure to take care of your health needs.  Return to 

the ER if your symptoms worsen, do not improve, or if you develop additional 

symptoms that are concerning to you











ZOHAIB SUAREZ DO             Oct 19, 2021 13:50

## 2022-03-08 ENCOUNTER — HOSPITAL ENCOUNTER (OUTPATIENT)
Dept: HOSPITAL 63 - US | Age: 42
End: 2022-03-08
Payer: OTHER GOVERNMENT

## 2022-03-08 DIAGNOSIS — N92.0: ICD-10-CM

## 2022-03-08 DIAGNOSIS — N83.201: Primary | ICD-10-CM

## 2022-03-08 PROCEDURE — 76856 US EXAM PELVIC COMPLETE: CPT

## 2022-03-09 NOTE — RAD
US PELVIS COMPLETE



Clinical Indication: Reason: MENORRHAGIA / Spl. Instructions:  / History: 



Comparison: None.



TECHNIQUE: Real-time ultrasound imaging of the pelvis using transabdominal window is performed.



Findings: 

Urinary bladder is unremarkable.



The endometrial stripe is normal measuring 8 mm.

Uterus measures 9.3 x 5.4 x 5.2 cm. The myometrium is mildly heterogeneous but there is no focal abno
rmality.



There is normal blood flow in the ovaries. There is a right functional cyst measuring up to 3.4 cm.



No evidence of adnexal mass. No pelvic free fluid is identified.



IMPRESSION:

1.  The endometrial stripe is normal.

2.  Mildly heterogeneous uterus.

3.  Normal blood flow in the ovaries.



Electronically signed by: Morgan Camara MD (3/9/2022 9:11 AM) [id number]